# Patient Record
Sex: MALE | Race: WHITE | Employment: OTHER | URBAN - METROPOLITAN AREA
[De-identification: names, ages, dates, MRNs, and addresses within clinical notes are randomized per-mention and may not be internally consistent; named-entity substitution may affect disease eponyms.]

---

## 2022-04-08 ENCOUNTER — HOSPITAL ENCOUNTER (OUTPATIENT)
Dept: PHYSICAL THERAPY | Age: 72
Discharge: HOME | End: 2022-04-08
Payer: MEDICARE

## 2022-04-08 PROCEDURE — 97161 PT EVAL LOW COMPLEX 20 MIN: CPT

## 2022-04-08 PROCEDURE — 97110 THERAPEUTIC EXERCISES: CPT

## 2022-04-08 NOTE — THERAPY EVALUATION
Ebonie Fair  : 1950  [place] / Product Type: Medicare /  [address]  at [facility] Therapy  [address], [address]  [phone]   [phone]         OUTPATIENT PHYSICAL THERAPY:Initial Assessment 2022   ICD-10: Treatment Diagnosis: Unilateral primary osteoarthritis, left knee [M17.12  Difficulty walking R 26.2, Left knee pain M25.562  Precautions/Allergies: Other medication and Tape [adhesive]   TREATMENT PLAN:  Effective Dates: 2022 TO 2022 (90 days). Frequency/Duration: 2 times a week for 90 Day(s) and upon reassessment, will adjust frequency and duration as progress indicates. MEDICAL/REFERRING DIAGNOSIS:  Unilateral primary osteoarthritis, left knee [M17.12]   DATE OF ONSET: chronic  REFERRING PHYSICIAN: Myra Billy,*  MD Orders: Erin Pan and treat  Return MD Appointment: as needed     INITIAL ASSESSMENT:  Mr. Eboni Huertas presents with decreased left knee ROM due to effects of OA. He reports pain with walking and standing. He did receive injection yesterday which has helped. He has not been able to perform his normal daily walks due to the pain and is avoiding activities that increase his pain. Possible future knee replacement may be needed. Pt will benefit from therapy to address ROM and strength limitations. PROBLEM LIST (Impacting functional limitations):  1. Decreased Strength  2. Decreased ADL/Functional Activities  3. Increased Pain  4. Decreased Activity Tolerance  5. Decreased Flexibility/Joint Mobility  6. Decreased [place] with Home Exercise Program INTERVENTIONS PLANNED: (Treatment may consist of any combination of the following)  1. Cold  2. Home Exercise Program (HEP)  3. Manual Therapy  4. Range of Motion (ROM)  5.  Therapeutic Exercise/Strengthening     GOALS: (Goals have been discussed and agreed upon with patient.)  Short-Term Functional Goals: Time Frame: 6 weeks  1. Establish independent HEP with no cuing. 2. Pt will be able to walk up to 1 1/2 miles. 3. Pt will be able to gain 1/2 grade increase in strength needed for curb negotiation. Discharge Goals: Time Frame: 12 weeks  1. Pt will be able to improve score on KOOS by at least 2-5 points for advanced ADL's.  2. Pt will be able to gain 5 degrees of knee motion for improved gait mechanics. 3. Pt will be able to rise from chair with minimal to no difficulty with left knee. OUTCOME MEASURE:   Tool Used: Knee Injury and Osteoarthritis Outcome Form (KOOS-JR.)  SCORE: None (0) Mild (1) Moderate (2) Severe (3) Extreme (4)   Stiffness:         1. How severe is your knee stiffness after first waking in the morning? [] [] [] [x] []   Pain:         What amount of knee pain have you experienced in the last week   doing the following activities? 2. Twisting/pivoting on your knee: [] [] [x] [] []   3. Straightening knee fully: [] [] [] [x] []   4. Going up or down stairs [] [] [x] [] []   5. Standing upright [] [] [x] [] []   Function, daily living        6. Rising from sitting [] [] [x] [] []   7. Bending to floor/ an object [] [] [x] [] []     Score:  Initial: 16 (Interval: 47.487) 4/8/2022/28 Most Recent: TBD/28 (Date: -- )   Interpretation of Score: Questions each scored on a 4 point scale with 4 representing the worst possible score and 0 representing the best possible score. The higher the point total, the worse the knee pain translating to a lower percentage of patient functioning. MEDICAL NECESSITY:   · Patient is expected to demonstrate progress in strength and range of motion to increase independence with daily walking and ADL's.  REASON FOR SERVICES/OTHER COMMENTS:  · Patient continues to require skilled intervention due to lack of full knee ROM and strength limiting his overall walking abilities.   Total Duration:  PT Patient Time In/Time Out  Time In: 0930    Rehabilitation Potential For Stated Goals: Good  Regarding Aleksandr E Anuj's therapy, I certify that the treatment plan above will be carried out by a therapist or under their direction. Thank you for this referral,  Josh Nava, PT     Referring Physician Signature: Irasema Aleman,* _______________________________ Date _____________                        PAIN/SUBJECTIVE:   Initial: Pain Intensity 1: 8  Post Session:  7/10   HISTORY:   History of Injury/Illness (Reason for Referral):  Pt with history of left knee OA with limited ROM and strength and difficulty with walking. He also has difficulty with initial rising from chair and with curb/step negotiation. Past Medical History/Comorbidities:   Mr. Georgia Mueller  has a past medical history of Arthritis and Psychiatric disorder. Mr. Georgia Mueller  has a past surgical history that includes pr breast surgery procedure unlisted; hx breast biopsy; hx orthopaedic; hx breast lumpectomy (8/2/2013); and endoscopy, colon, diagnostic (2011). Social History/Living Environment:     pt lives independently  Prior Level of Function/Work/Activity:  Pt is retired , enjoys daily walks     Ambulatory/Rehab Services H2 Model Falls Risk Assessment   Risk Factors:       (1)  Gender [Male]       (2)  Any administered antiepileptics/anticonvulsants Ability to Rise from Chair:       (1)  Pushes up, successful in one attempt   Falls Prevention Plan:       No modifications necessary4   Total: (5 or greater = High Risk): 4   ©2010 [facility] of [address] Patent #[id number]. Federal Law prohibits the replication, distribution or use without written permission from [facility] [employer]   Current Medications:       Current Outpatient Medications:     clonazePAM (KLONOPIN) 2 mg tablet, Take 1 mg by mouth nightly. At bedtime, Disp: , Rfl:     risperiDONE (RISPERDAL) 1 mg tablet, Take  by mouth daily.  At bedtime, Disp: , Rfl:     divalproex DR (DEPAKOTE) 500 mg tablet, Take 500 mg by mouth daily. At bedtime  Indications: BIPOLAR DISORDER IN REMISSION, Disp: , Rfl:     aspirin 81 mg tablet, Take 81 mg by mouth daily. EVERY OTHER DAY Last dose 7/28/13, Disp: , Rfl:    Date Last Reviewed:  4/8/2022   Number of Personal Factors/Comorbidities that affect the Plan of Care: 1-2: MODERATE COMPLEXITY        EXAMINATION:   Palpation:          Mild soreness at medial knee  ROM:        R knee 1-140 °  L knee  °                                Strength:     R LE 5/5         L hip flex 4+/5, quad 4/5, HS 4/5            Special Tests: na  Neurological Screen: na  Balance:  fair with dynamic SLS      Body Structures Involved:  1. Bones  2. Joints  3. Muscles Body Functions Affected:  1. Sensory/Pain  2. Neuromusculoskeletal  3. Movement Related Activities and Participation Affected:  1. General Tasks and Demands  2. Mobility  3. Domestic Life  4. Interpersonal Interactions and Relationships  5.  Community, Social and [place]   Number of elements (examined above) that affect the Plan of Care: 1-2: LOW COMPLEXITY   CLINICAL PRESENTATION:   Presentation: Stable and uncomplicated: LOW COMPLEXITY   CLINICAL DECISION MAKING:   Use of outcome tool(s) and clinical judgement create a POC that gives a: Questionable prediction of patient's progress: MODERATE COMPLEXITY

## 2022-04-08 NOTE — PROGRESS NOTES
Jonh Fernández  : 1950  [place] / Product Type: Medicare /  [address] [place]  at [place] [address] Therapy  [address] [place] [address], [address]  [phone]   [phone]      OUTPATIENT PHYSICAL THERAPY: Daily Treatment Note 2022  Visit Count:  1    ICD-10: Treatment Diagnosis: Unilateral primary osteoarthritis, left knee [M17.12], difficulty walking R 26.2, left knee pain M25.562  Precautions/Allergies: Other medication and Tape [adhesive]   TREATMENT PLAN:  Effective Dates: 2022 TO 2022 (90 days). Frequency/Duration: 2 times a week for 90 Day(s) MEDICAL/REFERRING DIAGNOSIS:  Unilateral primary osteoarthritis, left knee [M17.12]   DATE OF ONSET: chronic  REFERRING PHYSICIAN: Martha Billy,*  MD Orders: Eval and treat  Return MD Appointment: as needed     Pre-treatment Symptoms/Complaints:  Pt reports feeling better since shot. Pain: Initial: Pain Intensity 1: 8  Post Session:  7/10   Medications Last Reviewed:  2022  Updated Objective Findings:  See evaluation note from today   TREATMENT:   THERAPEUTIC EXERCISE: (15 minutes):  Exercises per grid below to improve mobility and strength. Required minimal verbal cues to promote proper body mechanics. Progressed resistance, range and repetitions as indicated. Bike x  7min  Slant board 3x hold 30 sec   Review of HEP to include SLR, bridging, hamstring stretching, heel slides  Manual Therapy (   na   ): na    Therapeutic Modalities (   na  ):na    Evaluation (x)    HEP: As above; handouts given to patient for all exercises. Treatment/Session Summary:    · Response to Treatment:  Pt seen for eval.  Progressed well during today's visit. Will return two times per week.   · Communication/Consultation:  None today  · Equipment provided today:  None today  · Recommendations/Intent for next treatment session: Next visit will focus on ROM, manual therapy and strengthening.   Total Treatment Billable Duration:  Eval plus 15 min  PT Patient Time In/Time Out  Time In: 0930  Time Out: 2710 Colton Pfeiffer, PT    Future Appointments   Date Time Provider Caleb Titus   4/13/2022 11:00 AM [facility], [facility]   4/20/2022 11:00 AM [facility]ver[facility]   4/22/2022 11:00 AM Michelle Carver, [facility]

## 2022-04-13 ENCOUNTER — HOSPITAL ENCOUNTER (OUTPATIENT)
Dept: PHYSICAL THERAPY | Age: 72
Discharge: HOME | End: 2022-04-13
Payer: MEDICARE

## 2022-04-13 PROCEDURE — 97140 MANUAL THERAPY 1/> REGIONS: CPT

## 2022-04-13 PROCEDURE — 97110 THERAPEUTIC EXERCISES: CPT

## 2022-04-13 NOTE — PROGRESS NOTES
Neeraj Finney  : 1950  [place] / Product Type: Medicare /  [address] [place]  at [place] [address] Therapy  [address], [facility] [address]  [phone]   [phone]      OUTPATIENT PHYSICAL THERAPY: Daily Treatment Note 2022  Visit Count:  2    ICD-10: Treatment Diagnosis: Unilateral primary osteoarthritis, left knee [M17.12], difficulty walking R 26.2, left knee pain M25.562  Precautions/Allergies: Other medication and Tape [adhesive]   TREATMENT PLAN:  Effective Dates: 2022 TO 2022 (90 days). Frequency/Duration: 2 times a week for 90 Day(s) MEDICAL/REFERRING DIAGNOSIS:  Unilateral primary osteoarthritis, left knee [M17.12]   DATE OF ONSET: chronic  REFERRING PHYSICIAN: Jaiden Billy,*  MD Orders: Eval and treat  Return MD Appointment: as needed     Pre-treatment Symptoms/Complaints:  Pt reports continued feelings of improvement  Pain: Initial: Pain Intensity 1: 0  Post Session:  0/10   Medications Last Reviewed:  2022  Updated Objective Findings:  None Today   TREATMENT:   THERAPEUTIC EXERCISE: (32 minutes):  Exercises per grid below to improve mobility and strength. Required minimal verbal cues to promote proper body mechanics. Progressed resistance, range and repetitions as indicated. Nu-step level 4 x 10 min  Step ups leading with each leg x 20 each side  Slant board 3x hold 30 sec   SAQ 5# x 20  LAQ 30# bilaterally 2 x 15  Shuttle press 150# 2 x 15  Hamstring stretching 3x hold 20 sec  Sit- stand x 10  Manual Therapy (   8 min   ): STM to left hamstrings for improved knee extension    Therapeutic Modalities (   na  ):na      HEP: continue as directed    Treatment/Session Summary:    · Response to Treatment:  Pt progressing well overall. Has felt much better since receiving injection. Mild limitations in left knee extension.   Worked manually on hamstring region as well to help improve his overall flexibility. · Communication/Consultation:  None today  · Equipment provided today:  None today  · Recommendations/Intent for next treatment session: Next visit will focus on ROM, manual therapy and strengthening.   Total Treatment Billable Duration:  40 min  PT Patient Time In/Time Out  Time In: 1100  Time Out: 6200 [facility] Eamon, PT    Future Appointments   Date Time Provider Caleb Titus   4/20/2022 11:00 AM Nehemias Wall, PT [facility]   4/22/2022 11:00 AM Nehemias Wall, PT [facility]

## 2022-04-20 ENCOUNTER — HOSPITAL ENCOUNTER (OUTPATIENT)
Dept: PHYSICAL THERAPY | Age: 72
Discharge: HOME | End: 2022-04-20
Payer: MEDICARE

## 2022-04-20 PROCEDURE — 97140 MANUAL THERAPY 1/> REGIONS: CPT

## 2022-04-20 PROCEDURE — 97110 THERAPEUTIC EXERCISES: CPT

## 2022-04-20 NOTE — PROGRESS NOTES
Elmer Sykes  : 1950  [place] / Product Type: Medicare /  [address] [place]  at [place] [address] Therapy  [address], [address]  [phone]   [phone]      OUTPATIENT PHYSICAL THERAPY: Daily Treatment Note 2022  Visit Count:  3    ICD-10: Treatment Diagnosis: Unilateral primary osteoarthritis, left knee [M17.12], difficulty walking R 26.2, left knee pain M25.562  Precautions/Allergies: Other medication and Tape [adhesive]   TREATMENT PLAN:  Effective Dates: 2022 TO 2022 (90 days). Frequency/Duration: 2 times a week for 90 Day(s) MEDICAL/REFERRING DIAGNOSIS:  Unilateral primary osteoarthritis, left knee [M17.12]   DATE OF ONSET: chronic  REFERRING PHYSICIAN: Durga Billy,*  MD Orders: Eval and treat  Return MD Appointment: as needed     Pre-treatment Symptoms/Complaints:  Pt reports feeling some mild soreness at times. Pain: Initial: Pain Intensity 1: 0  Post Session:  0/10   Medications Last Reviewed:  2022  Updated Objective Findings:  None Today   TREATMENT:   THERAPEUTIC EXERCISE: (32 minutes):  Exercises per grid below to improve mobility and strength. Required minimal verbal cues to promote proper body mechanics. Progressed resistance, range and repetitions as indicated. Nu-step level 4 x 10 min  Step ups leading with each leg x 20 each side  Slant board 3x hold 30 sec   SAQ 5# x 20  LAQ 30# bilaterally 2 x 15  Shuttle press 150# 2 x 15  Hamstring stretching 3x hold 20 sec  Sit- stand x 10  Manual Therapy (   8 min   ): STM to left hamstrings for improved knee extension    Therapeutic Modalities (   na  ):na      HEP: continue as directed    Treatment/Session Summary:    · Response to Treatment:  Mild decrease in knee extension remains. Will need to add TKE's to next treatment for additional quad work and to work into full knee extension.   .  · Communication/Consultation:  None today  · Equipment provided today:  None today  · Recommendations/Intent for next treatment session: Next visit will focus on ROM, manual therapy and strengthening.   Total Treatment Billable Duration:  40 min  PT Patient Time In/Time Out  Time In: 1100  Time Out: 6200 [facility] Eamon PT    Future Appointments   Date Time Provider Caleb Titus   4/22/2022 11:00 AM Fanta Bains, PT [facility]

## 2022-04-22 ENCOUNTER — HOSPITAL ENCOUNTER (OUTPATIENT)
Dept: PHYSICAL THERAPY | Age: 72
Discharge: HOME | End: 2022-04-22
Payer: MEDICARE

## 2022-04-22 PROCEDURE — 97110 THERAPEUTIC EXERCISES: CPT

## 2022-04-22 PROCEDURE — 97140 MANUAL THERAPY 1/> REGIONS: CPT

## 2022-04-22 NOTE — PROGRESS NOTES
Vic Coats  : 1950  [place] / Product Type: Medicare /  [address] [place]  at [facility] Therapy  [address], [address]  [phone]   [phone]      OUTPATIENT PHYSICAL THERAPY: Daily Treatment Note 2022  Visit Count:  4    ICD-10: Treatment Diagnosis: Unilateral primary osteoarthritis, left knee [M17.12], difficulty walking R 26.2, left knee pain M25.562  Precautions/Allergies: Other medication and Tape [adhesive]   TREATMENT PLAN:  Effective Dates: 2022 TO 2022 (90 days). Frequency/Duration: 2 times a week for 90 Day(s) MEDICAL/REFERRING DIAGNOSIS:  Unilateral primary osteoarthritis, left knee [M17.12]   DATE OF ONSET: chronic  REFERRING PHYSICIAN: Burnikel, Nathen Meigs,* MD Orders: Eval and treat  Return MD Appointment: as needed     Pre-treatment Symptoms/Complaints:  Pt reports feeling some continued soreness. Pain: Initial: Pain Intensity 1: 2  Post Session:  1/10   Medications Last Reviewed:  2022  Updated Objective Findings:  None Today   TREATMENT:   THERAPEUTIC EXERCISE: (32 minutes):  Exercises per grid below to improve mobility and strength. Required minimal verbal cues to promote proper body mechanics. Progressed resistance, range and repetitions as indicated. Nu-step level 4 x 10 min  Step ups leading with each leg x 20 each side  Slant board 3x hold 30 sec   SAQ 5# x 20  LAQ 30# bilaterally 2 x 15  Shuttle press 150# 2 x 15  Hamstring stretching 3x hold 20 sec  Sit- stand x 10  TKE 35# x 30  Manual Therapy (   8 min   ): STM to left hamstrings for improved knee extension    Therapeutic Modalities (   na  ):na      HEP: continue as directed    Treatment/Session Summary:    · Response to Treatment:  Pt progressing well. Some mild tenderness. Added TKE for added straighening and quad strength. No increased pain noted. .  · Communication/Consultation:  None today  · Equipment provided today:  None today  · Recommendations/Intent for next treatment session: Next visit will focus on ROM, manual therapy and strengthening.   Total Treatment Billable Duration:  40 min  PT Patient Time In/Time Out  Time In: 1396  Time Out: [address], PT    Future Appointments   Date Time Provider Caleb Titus   5/4/2022 11:45 AM Anne Alvarez, PT [facility]

## 2022-05-04 ENCOUNTER — HOSPITAL ENCOUNTER (OUTPATIENT)
Dept: PHYSICAL THERAPY | Age: 72
Discharge: HOME | End: 2022-05-04
Payer: MEDICARE

## 2022-05-04 PROCEDURE — 97140 MANUAL THERAPY 1/> REGIONS: CPT

## 2022-05-04 PROCEDURE — 97110 THERAPEUTIC EXERCISES: CPT

## 2022-05-04 NOTE — PROGRESS NOTES
Val Guaman  : 1950  [place] / Product Type: Medicare /  [address] [place]  at [place] [address] Therapy  [address], [address]  [phone]   [phone]      OUTPATIENT PHYSICAL THERAPY: Daily Treatment Note 2022  Visit Count:  5    ICD-10: Treatment Diagnosis: Unilateral primary osteoarthritis, left knee [M17.12], difficulty walking R 26.2, left knee pain M25.562  Precautions/Allergies: Other medication and Tape [adhesive]   TREATMENT PLAN:  Effective Dates: 2022 TO 2022 (90 days). Frequency/Duration: 2 times a week for 90 Day(s) MEDICAL/REFERRING DIAGNOSIS:  Unilateral primary osteoarthritis, left knee [M17.12]   DATE OF ONSET: chronic  REFERRING PHYSICIAN: Wesley Billy,*  MD Orders: Benny Perez and treat  Return MD Appointment: as needed     Pre-treatment Symptoms/Complaints:  Pt reports feeling increased heaviness and dull ache in the knee, but overall feels better than he did. Pain: Initial: Pain Intensity 1: 2  Post Session:  1/10   Medications Last Reviewed:  2022  Updated Objective Findings:  None Today   TREATMENT:   THERAPEUTIC EXERCISE: (35 minutes):  Exercises per grid below to improve mobility and strength. Required minimal verbal cues to promote proper body mechanics. Progressed resistance, range and repetitions as indicated. Nu-step level 4 x 10 min  Step ups leading with each leg x 20 each side  Slant board 3x hold 30 sec   SAQ 5# x 20  LAQ 30# bilaterally 2 x 15  Shuttle press 150# 2 x 15  Hamstring stretching 3x hold 20 sec  Sit- stand x 10  TKE 35# x 30  Lateral step ups x 30  Manual Therapy (   10 min   ): STM to left hamstrings for improved knee extension    Therapeutic Modalities (   na  ):na      HEP: continue as directed    Treatment/Session Summary:    · Response to Treatment:  Pt reports an overall improvement, but still has some heaviness and soreness in the leg.   Able to complete exercises without difficulty. Will continue with HEP and return to MD to discuss further treatment options. · Communication/Consultation:  None today  · Equipment provided today:  None today  Total Treatment Billable Duration:  45 min  PT Patient Time In/Time Out  Time In: 1148  Time Out: 3796 Edson Madden, PT    No future appointments.

## 2022-05-04 NOTE — THERAPY DISCHARGE
Martha Watkins  : 1950  [place] / Product Type: Medicare /  [address] [place]  at [facility] Therapy  [address], [facility] [address]  [phone]   [phone]         OUTPATIENT PHYSICAL THERAPY:Discharge Summary 2022   ICD-10: Treatment Diagnosis: Unilateral primary osteoarthritis, left knee [M17.12  Difficulty walking R 26.2, Left knee pain M25.562  Precautions/Allergies: Other medication and Tape [adhesive]   TREATMENT PLAN:  Effective Dates: 2022 TO 2022 (90 days). Frequency/Duration: 2 times a week for 90 Day(s) and upon reassessment, will adjust frequency and duration as progress indicates. MEDICAL/REFERRING DIAGNOSIS:  Unilateral primary osteoarthritis, left knee [M17.12]   DATE OF ONSET: chronic  REFERRING PHYSICIAN: Heron Billy,*  MD Orders: Maycol Matthews and treat  Return MD Appointment: as needed     INITIAL ASSESSMENT:  Mr. Valdo Avalos presents with decreased left knee ROM due to effects of OA. He reports pain with walking and standing. He did receive injection yesterday which has helped. He has not been able to perform his normal daily walks due to the pain and is avoiding activities that increase his pain. Possible future knee replacement may be needed. Pt will benefit from therapy to address ROM and strength limitations. Discharge Assessment:  Pt has 5-125 ° of knee motion with 4+/5 to 5/5 strength. Overall he has noted an improvement, although the leg still feels heavy and sore at times. He will continue with HEP provided and return to MD.   PROBLEM LIST (Impacting functional limitations):  1. Decreased Strength  2. Decreased ADL/Functional Activities  3. Increased Pain  4. Decreased Activity Tolerance  5. Decreased Flexibility/Joint Mobility  6.  Decreased [place] with Home Exercise Program INTERVENTIONS PLANNED: (Treatment may consist of any combination of the following)  1. Cold  2. Home Exercise Program (HEP)  3. Manual Therapy  4. Range of Motion (ROM)  5. Therapeutic Exercise/Strengthening     GOALS: (Goals have been discussed and agreed upon with patient.)  Short-Term Functional Goals: Time Frame: 6 weeks  1. Establish independent HEP with no cuing.-met  2. Pt will be able to walk up to 1 1/2 miles. - in progress  3. Pt will be able to gain 1/2 grade increase in strength needed for curb negotiation. -met  Discharge Goals: Time Frame: 12 weeks  1. Pt will be able to improve score on KOOS by at least 2-5 points for advanced ADL's.-not assessed  2. Pt will be able to gain 5 degrees of knee motion for improved gait mechanics. -for extension  3. Pt will be able to rise from chair with minimal to no difficulty with left knee. - intermittently meeting    OUTCOME MEASURE:   Tool Used: Knee Injury and Osteoarthritis Outcome Form (KOOS-JR.)  SCORE: None (0) Mild (1) Moderate (2) Severe (3) Extreme (4)   Stiffness:         1. How severe is your knee stiffness after first waking in the morning? [] [] [] [x] []   Pain:         What amount of knee pain have you experienced in the last week   doing the following activities? 2. Twisting/pivoting on your knee: [] [] [x] [] []   3. Straightening knee fully: [] [] [] [x] []   4. Going up or down stairs [] [] [x] [] []   5. Standing upright [] [] [x] [] []   Function, daily living        6. Rising from sitting [] [] [x] [] []   7. Bending to floor/ an object [] [] [x] [] []     Score:  Initial: 16 (Interval: 47.487) 4/8/2022/28 Most Recent: TBD/28 (Date: -- )   Interpretation of Score: Questions each scored on a 4 point scale with 4 representing the worst possible score and 0 representing the best possible score. The higher the point total, the worse the knee pain translating to a lower percentage of patient functioning.         Total Duration:  PT Patient Time In/Time Out  Time In: 1145  Time Out: 1230    Rehabilitation Potential For Stated Goals: Good  Regarding Aleksandr Leslie's therapy, I certify that the treatment plan above will be carried out by a therapist or under their direction.   Thank you for this referral,  Aniya Jacobson, PT

## 2022-06-16 ENCOUNTER — APPOINTMENT (RX ONLY)
Dept: URBAN - METROPOLITAN AREA CLINIC 329 | Facility: CLINIC | Age: 72
Setting detail: DERMATOLOGY
End: 2022-06-16

## 2022-06-16 DIAGNOSIS — L57.8 OTHER SKIN CHANGES DUE TO CHRONIC EXPOSURE TO NONIONIZING RADIATION: ICD-10-CM

## 2022-06-16 DIAGNOSIS — D22 MELANOCYTIC NEVI: ICD-10-CM

## 2022-06-16 DIAGNOSIS — L82.0 INFLAMED SEBORRHEIC KERATOSIS: ICD-10-CM

## 2022-06-16 DIAGNOSIS — Z71.89 OTHER SPECIFIED COUNSELING: ICD-10-CM

## 2022-06-16 PROBLEM — D22.5 MELANOCYTIC NEVI OF TRUNK: Status: ACTIVE | Noted: 2022-06-16

## 2022-06-16 PROCEDURE — ? EDUCATIONAL RESOURCES PROVIDED

## 2022-06-16 PROCEDURE — 17110 DESTRUCTION B9 LES UP TO 14: CPT

## 2022-06-16 PROCEDURE — ? TREATMENT REGIMEN

## 2022-06-16 PROCEDURE — ? OTHER

## 2022-06-16 PROCEDURE — ? COUNSELING

## 2022-06-16 PROCEDURE — ? LIQUID NITROGEN

## 2022-06-16 PROCEDURE — 99203 OFFICE O/P NEW LOW 30 MIN: CPT | Mod: 25

## 2022-06-16 PROCEDURE — ? BODY PHOTOGRAPHY

## 2022-06-16 PROCEDURE — ? FULL BODY SKIN EXAM

## 2022-06-16 ASSESSMENT — LOCATION SIMPLE DESCRIPTION DERM
LOCATION SIMPLE: UPPER BACK
LOCATION SIMPLE: LEFT SCALP
LOCATION SIMPLE: RIGHT ANTERIOR NECK
LOCATION SIMPLE: LEFT UPPER BACK
LOCATION SIMPLE: CHEST
LOCATION SIMPLE: LEFT WRIST

## 2022-06-16 ASSESSMENT — LOCATION DETAILED DESCRIPTION DERM
LOCATION DETAILED: INFERIOR THORACIC SPINE
LOCATION DETAILED: SUPERIOR THORACIC SPINE
LOCATION DETAILED: LEFT MEDIAL INFERIOR CHEST
LOCATION DETAILED: RIGHT INFERIOR LATERAL NECK
LOCATION DETAILED: LEFT DORSAL WRIST
LOCATION DETAILED: LEFT MEDIAL UPPER BACK
LOCATION DETAILED: LEFT MEDIAL FRONTAL SCALP

## 2022-06-16 ASSESSMENT — LOCATION ZONE DERM
LOCATION ZONE: SCALP
LOCATION ZONE: TRUNK
LOCATION ZONE: NECK
LOCATION ZONE: ARM

## 2022-06-16 NOTE — PROCEDURE: MIPS QUALITY
Quality 110: Preventive Care And Screening: Influenza Immunization: Influenza Immunization Administered during Influenza season
Quality 47: Advance Care Plan: Advance care planning not documented, reason not otherwise specified.
Detail Level: Detailed
Quality 111:Pneumonia Vaccination Status For Older Adults: Pneumococcal vaccine administered on or after patient’s 60th birthday and before the end of the measurement period
Quality 402: Tobacco Use And Help With Quitting Among Adolescents: Patient screened for tobacco and never smoked
Quality 431: Preventive Care And Screening: Unhealthy Alcohol Use - Screening: Patient not identified as an unhealthy alcohol user when screened for unhealthy alcohol use using a systematic screening method
Quality 130: Documentation Of Current Medications In The Medical Record: Current Medications Documented
Quality 226: Preventive Care And Screening: Tobacco Use: Screening And Cessation Intervention: Patient screened for tobacco use and is an ex/non-smoker

## 2022-06-16 NOTE — PROCEDURE: LIQUID NITROGEN
Add 52 Modifier (Optional): no
Detail Level: Detailed
Medical Necessity Information: It is in your best interest to select a reason for this procedure from the list below. All of these items fulfill various CMS LCD requirements except the new and changing color options.
Show Applicator Variable?: Yes
Medical Necessity Clause: This procedure was medically necessary because the lesions that were treated were:
Post-Care Instructions: I reviewed with the patient in detail post-care instructions. Patient is to wear sunprotection, and avoid picking at any of the treated lesions. Pt may apply Vaseline to crusted or scabbing areas.
Consent: The patient's consent was obtained including but not limited to risks of crusting, scabbing, blistering, scarring, darker or lighter pigmentary change, recurrence, incomplete removal and infection.
Spray Paint Text: The liquid nitrogen was applied to the skin utilizing a spray paint frosting technique.

## 2023-06-05 ENCOUNTER — APPOINTMENT (RX ONLY)
Dept: URBAN - METROPOLITAN AREA CLINIC 330 | Facility: CLINIC | Age: 73
Setting detail: DERMATOLOGY
End: 2023-06-05

## 2023-06-05 DIAGNOSIS — D22 MELANOCYTIC NEVI: ICD-10-CM | Status: STABLE

## 2023-06-05 DIAGNOSIS — L82.0 INFLAMED SEBORRHEIC KERATOSIS: ICD-10-CM

## 2023-06-05 DIAGNOSIS — L57.8 OTHER SKIN CHANGES DUE TO CHRONIC EXPOSURE TO NONIONIZING RADIATION: ICD-10-CM

## 2023-06-05 PROBLEM — D22.62 MELANOCYTIC NEVI OF LEFT UPPER LIMB, INCLUDING SHOULDER: Status: ACTIVE | Noted: 2023-06-05

## 2023-06-05 PROBLEM — D22.5 MELANOCYTIC NEVI OF TRUNK: Status: ACTIVE | Noted: 2023-06-05

## 2023-06-05 PROCEDURE — ? FULL BODY SKIN EXAM

## 2023-06-05 PROCEDURE — ? TREATMENT REGIMEN

## 2023-06-05 PROCEDURE — ? LIQUID NITROGEN

## 2023-06-05 PROCEDURE — ? MEDICAL PHOTOGRAPHY REVIEW

## 2023-06-05 PROCEDURE — ? COUNSELING

## 2023-06-05 PROCEDURE — ? DEFER

## 2023-06-05 PROCEDURE — 17110 DESTRUCTION B9 LES UP TO 14: CPT

## 2023-06-05 PROCEDURE — 99213 OFFICE O/P EST LOW 20 MIN: CPT | Mod: 25

## 2023-06-05 PROCEDURE — ? OTHER

## 2023-06-05 ASSESSMENT — LOCATION DETAILED DESCRIPTION DERM
LOCATION DETAILED: SUPERIOR THORACIC SPINE
LOCATION DETAILED: INFERIOR THORACIC SPINE
LOCATION DETAILED: LEFT SUBMANDIBULAR AREA
LOCATION DETAILED: RIGHT CENTRAL LATERAL NECK
LOCATION DETAILED: LEFT CLAVICULAR NECK
LOCATION DETAILED: LEFT VENTRAL DISTAL FOREARM

## 2023-06-05 ASSESSMENT — LOCATION ZONE DERM
LOCATION ZONE: NECK
LOCATION ZONE: FACE
LOCATION ZONE: TRUNK
LOCATION ZONE: ARM

## 2023-06-05 ASSESSMENT — LOCATION SIMPLE DESCRIPTION DERM
LOCATION SIMPLE: LEFT SUBMANDIBULAR AREA
LOCATION SIMPLE: UPPER BACK
LOCATION SIMPLE: LEFT ANTERIOR NECK
LOCATION SIMPLE: LEFT FOREARM
LOCATION SIMPLE: NECK

## 2023-06-05 NOTE — PROCEDURE: LIQUID NITROGEN
Consent: The patient's consent was obtained including but not limited to risks of crusting, scabbing, blistering, scarring, darker or lighter pigmentary change, recurrence, incomplete removal and infection.
Include Z78.9 (Other Specified Conditions Influencing Health Status) As An Associated Diagnosis?: No
Post-Care Instructions: I reviewed with the patient in detail post-care instructions. Patient is to wear sunprotection, and avoid picking at any of the treated lesions. Pt may apply Vaseline to crusted or scabbing areas.
Medical Necessity Clause: This procedure was medically necessary because the lesions that were treated were:
Show Topical Anesthesia Variable?: Yes
Spray Paint Text: The liquid nitrogen was applied to the skin utilizing a spray paint frosting technique.
Medical Necessity Information: It is in your best interest to select a reason for this procedure from the list below. All of these items fulfill various CMS LCD requirements except the new and changing color options.
Detail Level: Detailed

## 2023-06-05 NOTE — PROCEDURE: MIPS QUALITY
Detail Level: Detailed
Quality 47: Advance Care Plan: Advance care planning not documented, reason not otherwise specified.
Quality 110: Preventive Care And Screening: Influenza Immunization: Influenza Immunization Administered during Influenza season
Quality 402: Tobacco Use And Help With Quitting Among Adolescents: Patient screened for tobacco and never smoked
Quality 431: Preventive Care And Screening: Unhealthy Alcohol Use - Screening: Patient not identified as an unhealthy alcohol user when screened for unhealthy alcohol use using a systematic screening method
Quality 226: Preventive Care And Screening: Tobacco Use: Screening And Cessation Intervention: Patient screened for tobacco use and is an ex/non-smoker
Quality 130: Documentation Of Current Medications In The Medical Record: Current Medications Documented
Oligohydramnios, third trimester, not applicable or unspecified fetus

## 2023-06-05 NOTE — PROCEDURE: DEFER
X Size Of Lesion In Cm (Optional): 0
Procedure To Be Performed At Next Visit: Biopsy by shave method
Introduction Text (Please End With A Colon): The following procedure 5 min biopsy
Detail Level: Detailed

## 2023-08-02 ENCOUNTER — APPOINTMENT (OUTPATIENT)
Dept: GENERAL RADIOLOGY | Age: 73
End: 2023-08-02
Payer: MEDICARE

## 2023-08-02 ENCOUNTER — HOSPITAL ENCOUNTER (EMERGENCY)
Age: 73
Discharge: HOME | End: 2023-08-02
Attending: EMERGENCY MEDICINE
Payer: MEDICARE

## 2023-08-02 VITALS
RESPIRATION RATE: 20 BRPM | OXYGEN SATURATION: 97 % | SYSTOLIC BLOOD PRESSURE: 142 MMHG | WEIGHT: 230 LBS | DIASTOLIC BLOOD PRESSURE: 82 MMHG | HEIGHT: 71 IN | TEMPERATURE: 98.2 F | HEART RATE: 87 BPM | BODY MASS INDEX: 32.2 KG/M2

## 2023-08-02 DIAGNOSIS — J20.9 ACUTE BRONCHITIS, UNSPECIFIED ORGANISM: Primary | ICD-10-CM

## 2023-08-02 LAB
FLUAV RNA SPEC QL NAA+PROBE: NOT DETECTED
FLUBV RNA SPEC QL NAA+PROBE: NOT DETECTED

## 2023-08-02 PROCEDURE — 87502 INFLUENZA DNA AMP PROBE: CPT

## 2023-08-02 PROCEDURE — 71046 X-RAY EXAM CHEST 2 VIEWS: CPT

## 2023-08-02 PROCEDURE — 99284 EMERGENCY DEPT VISIT MOD MDM: CPT

## 2023-08-02 RX ORDER — ALBUTEROL SULFATE 90 UG/1
2 AEROSOL, METERED RESPIRATORY (INHALATION) 4 TIMES DAILY PRN
Qty: 54 G | Refills: 1 | Status: SHIPPED | OUTPATIENT
Start: 2023-08-02

## 2023-08-02 RX ORDER — MELOXICAM 7.5 MG/1
7.5 TABLET ORAL 2 TIMES DAILY
Qty: 60 TABLET | Refills: 11 | COMMUNITY
Start: 2022-09-26 | End: 2023-09-02

## 2023-08-02 RX ORDER — AMOXICILLIN 875 MG/1
875 TABLET, COATED ORAL 2 TIMES DAILY
Qty: 20 TABLET | Refills: 0 | Status: SHIPPED | OUTPATIENT
Start: 2023-08-02 | End: 2023-08-12

## 2023-08-02 RX ORDER — GUAIFENESIN 200 MG/10ML
200 LIQUID ORAL 3 TIMES DAILY PRN
Qty: 120 ML | Refills: 0 | Status: SHIPPED | OUTPATIENT
Start: 2023-08-02

## 2023-08-02 ASSESSMENT — PAIN SCALES - GENERAL
PAINLEVEL_OUTOF10: 4
PAINLEVEL_OUTOF10: 4

## 2023-08-02 ASSESSMENT — PAIN DESCRIPTION - LOCATION: LOCATION: HEAD

## 2023-08-02 ASSESSMENT — LIFESTYLE VARIABLES
HOW OFTEN DO YOU HAVE A DRINK CONTAINING ALCOHOL: NEVER
HOW MANY STANDARD DRINKS CONTAINING ALCOHOL DO YOU HAVE ON A TYPICAL DAY: PATIENT DOES NOT DRINK

## 2023-08-02 ASSESSMENT — ENCOUNTER SYMPTOMS
COUGH: 1
SORE THROAT: 1
SINUS CONGESTION: 1
WHEEZING: 0

## 2023-08-02 ASSESSMENT — PAIN - FUNCTIONAL ASSESSMENT
PAIN_FUNCTIONAL_ASSESSMENT: 0-10
PAIN_FUNCTIONAL_ASSESSMENT: 0-10

## 2023-08-02 NOTE — ED PROVIDER NOTES
Emergency Department Provider Note       PCP: Daniel Townsend MD   Age: 75 y.o. Sex: male     DISPOSITION Decision To Discharge 08/02/2023 02:24:04 PM       ICD-10-CM    1. Acute bronchitis, unspecified organism  J20.9           Medical Decision Making     Complexity of Problems Addressed:  Complexity of Problem: 1 acute, uncomplicated illness or injury. Data Reviewed and Analyzed:  Category 1:   I independently ordered and reviewed each unique test.  I reviewed external records: ED visit note from an outside group. I reviewed external records: provider visit note from PCP. I reviewed external records: previous lab results from outside ED. Category 2:   I interpreted the X-rays. No pneumonia    Category 3: Discussion of management or test interpretation. Patient reports a negative COVID swab yesterday. We will test for the flu. Will obtain chest x-ray. 2:26 PM EDT  Flu swab is negative. No pneumonia noted on chest x-ray. Will discharge home, given age will start antibiotics for possibly developing bronchitis. Upon reassessment has slight expiratory wheeze but is not hypoxic or tachypneic. .  Will give prescription for albuterol. Risk of Complications and/or Morbidity of Patient Management:      History     Reese Mcclain is a 75 y.o. male who presents to the Emergency Department with chief complaint of    Chief Complaint   Patient presents with    Dizziness    Cough    Pharyngitis    Eye Drainage      Patient presents to the ER with sore throat, cough, congestion and fatigue. Patient states symptoms have been present for approximately a week. Reports started with sore throat and congestion. States he feels more \"congested in his chest\". Denies any shortness of breath. Reports subjective fevers and chills. States when he gets the subjective fevers and chills he does feel dizzy. Denies any vomiting or diaphoresis. Denies any chest pain.   Denies any leg pain or leg

## 2024-10-21 NOTE — PROCEDURE: BODY PHOTOGRAPHY
Detail Level: Zone
Detail Level: Detailed
Reason For Photography: The patient is obtaining whole body photography to observe existing suspicious moles and or monitor for the appearance of any new lesions.
Was The Entire Body Photographed (Cannot Bill Unless Entire Body Photographed)?: Please Select Yes or No - If you select Yes you are confirming that you took full body photographs
Whole Body Statement: The whole body was photographed today.
Consent was obtained for whole body photography. Patient understands that photograph costs may not be covered by insurance.
Number Of Photographs (Optional- Will Not Render If 0): 2